# Patient Record
Sex: FEMALE | Race: WHITE | URBAN - METROPOLITAN AREA
[De-identification: names, ages, dates, MRNs, and addresses within clinical notes are randomized per-mention and may not be internally consistent; named-entity substitution may affect disease eponyms.]

---

## 2021-01-25 ENCOUNTER — HISTORICAL (OUTPATIENT)
Dept: SURGERY | Facility: HOSPITAL | Age: 61
End: 2021-01-25

## 2022-05-01 NOTE — OP NOTE
Patient:   Josefina Constantino             MRN: [id number]            FIN: [id number]               Age:   60 years     Sex:  Female     :  1960   Associated Diagnoses:   End stage renal disease on dialysis   Author:   Sandy SIFUENTES, Maryuri CASTAÑEDA      Operative Note   Operative Information   Date/ Time:  2021 11:42:00.     Procedures Performed: Left brachiocephalic fistula   .     Indications: Mrs. Constantino is a 59 y/o woman with ESRD who needs long term hemodialysis access creation.   Preoperative mapping suggests a suitable left upper arm cephalic vein..     Preoperative Diagnosis: End stage renal disease on dialysis (PDD53-NO N18.6).     Postoperative Diagnosis: End stage renal disease on dialysis (SUS38-PB N18.6).     Surgeon: Maryuri Delgado MD.     Assistant: Laurie Chong.     Speciman Removed: none   .     Esimated blood loss: loss less than  100  cc.     Description of Procedure/Findings/    Complications: The risks and benefits of the procedure were discussed with the patient prior to the procedure and the patient desires to proceed.   Her left arm was prepped in the usual sterile fashion.   An appropriate time out was performed.  Ultrasound was utlilized to identify the cephalic vein in the upper arm.  It was patent throughout and appropriate in size in the upper arm.   A curvilinear incision was made over the arterial structures (high bifurcation) and the cephalic vein just proximal to the antecubital fossa.   Dissection was performed through the superficial soft tissues and then followed the appropriate plane laterally and medially to identify the cephalic vein and the upper arm arteries.  Each of these vessels were isolated and side branches were ligated with 4-0 silk when appropriate.  The brachial artery and the adjacent artery were both evaluated.   Although the brachial artery was larger, the arteries were very similar in size.  The more superficial artery created better  positioning for the fistula and was selected as the donor artery.   Her lack of adipose tissue otherwise would result in significant angulation and compression to the fistula.   The cephalic vein was ligated distally with a 3-0 silk. The artery had no disease and appeared appropriate for fistula creation.  The cephalic vein easily accepted a 4mm dilator and demonstrated a thrill with heparin saline injection.  4000 units of heparin was given IV.  A longitudinal incision was made on the brachial artery.  The vein was spatulated appropriately.  Anastamosis was created with a 6-0 prolene suture. Hemostasis was obtained and the soft tissue was dissected to allow for appropriate lay of the vein graft.  There was a good thrill and a palpable radial pulse. 3-0 vicryl was utilized to close the subcutaneous structures and a 4-0 monocryl was utilized to close the skin. Dermabond was used to dress the wound. This completed the procedure.       Laurie Barrow expertly assisted throughout the case.  She assisted with vessel exposure and anastamosis.  She performed wound closure. .     Findings:    ,    .     Complications: None.

## 2024-06-19 RX ORDER — PANTOPRAZOLE SODIUM 40 MG/1
1 TABLET, DELAYED RELEASE ORAL DAILY
COMMUNITY
Start: 2024-01-22

## 2024-06-19 RX ORDER — LEVOTHYROXINE SODIUM 112 UG/1
1 TABLET ORAL DAILY
COMMUNITY

## 2024-06-19 RX ORDER — LEVOFLOXACIN 750 MG/1
1 TABLET ORAL DAILY
COMMUNITY
Start: 2024-04-09 | End: 2024-07-04 | Stop reason: ALTCHOICE

## 2024-06-19 RX ORDER — ALPRAZOLAM 0.25 MG/1
1 TABLET ORAL DAILY PRN
COMMUNITY

## 2024-06-19 RX ORDER — PREDNISONE 20 MG/1
2 TABLET ORAL DAILY
COMMUNITY
Start: 2024-04-09

## 2024-06-19 RX ORDER — FERRIC CITRATE 210 MG/1
TABLET, COATED ORAL
COMMUNITY

## 2024-06-19 RX ORDER — ONDANSETRON 4 MG/1
TABLET, ORALLY DISINTEGRATING ORAL
COMMUNITY

## 2024-06-19 RX ORDER — ALBUTEROL SULFATE 0.83 MG/ML
SOLUTION RESPIRATORY (INHALATION)
COMMUNITY
Start: 2024-06-04

## 2024-06-19 RX ORDER — CITALOPRAM 20 MG/1
1 TABLET, FILM COATED ORAL DAILY
COMMUNITY

## 2024-06-19 RX ORDER — SUCROFERRIC OXYHYDROXIDE 500 MG/1
TABLET, CHEWABLE ORAL
COMMUNITY
Start: 2023-12-26

## 2024-06-19 RX ORDER — AMLODIPINE BESYLATE 5 MG/1
TABLET ORAL
COMMUNITY
Start: 2023-11-10

## 2024-06-19 RX ORDER — BUDESONIDE 0.5 MG/2ML
INHALANT ORAL
COMMUNITY
Start: 2024-03-25

## 2024-06-19 RX ORDER — IPRATROPIUM BROMIDE AND ALBUTEROL SULFATE 2.5; .5 MG/3ML; MG/3ML
SOLUTION RESPIRATORY (INHALATION)
COMMUNITY
Start: 2024-04-09

## 2024-06-19 NOTE — PROGRESS NOTES
[facility] - Clinic Note  Maryuri Delgado MD      Patient Name: Josefina Constantino                   : 1960     MRN: [id number]   Visit Date: 2024          History Present Illness     Reason for Visit: Aneurysmal Changes    Ms. Constantino presents to the clinic for evaluation of aneurysmal changes to her left brachiocephalic fistula. Her access was creation in . Most recent clearance was 2.1 and access flow was 1167. She denies issues with cannulation. There is occasional bleeding post cannulation.        REVIEW OF SYSTEMS:  ROS  12 point review of systems conducted, negative except as stated in the history of present illness. See HPI for details.        Physical Exam      Vitals:    24 1527   BP: (!) 153/75   BP Location: Right arm   Pulse: 73        General: well-nourished, no acute distress, and healthy appearing, ambulating with assistance, alert, pleasant, conversant, and oriented, blind  Respiratory: breathing easily and without respiratory distress  Abdomen: normal and soft  Cardiology: regular rate and rhythm    Upper Extremity Arterial Exam:   Left - radial is palpable    Dialysis Access:  left brachiocephalic fistula  Assessment: aneurysmal changes with focal skin fusion at cannulation sites (cannulation seems to be kept to focal areas)    Musculoskeletal:   Upper Extremity: normal left hand function  Lower Extremity: no edema present to bilateral lower extremities              Assessment and Plan     Ms. Constantino is a 64 y.o. woman with end stage renal disease (ESRD) on dialysis who presents for evaluation of her aneurysmal left brachiocephalic fistula. On exam today, her aneurysmal fistula has focal skin fusion at both cannulation sites - which do not appear to be undergoing much rotation. There is a good thrill without pulsatility appreciated. Dialysis unit reports access is without functional issues. Advise rotation of cannulation sites to avoid further change in the  areas of skin fusion. I do not recommend intervention at this time as her the appearance of his fistula is overall healthy. Recommend continued surveillance of access in 6 months. Instructed patient to return sooner with any issues or concerns.        1. Mechanical complication of arteriovenous shunt surgically created, initial encounter    2. ESRD (end stage renal disease)             Imaging Obtained/Reviewed   Study: none  Date:              Medical History     Past Medical History:   Diagnosis Date    AAA (abdominal aortic aneurysm)     repaired 2020    COPD (chronic obstructive pulmonary disease)     COVID-19     ESRD (end stage renal disease)     on HD    HTN (hypertension)     Hypertensive renal failure     Stroke     h/o TIA    Thyroid disease      Past Surgical History:   Procedure Laterality Date    AV FISTULA PLACEMENT Left 2021    L brachiocephalic fistula - [place]     SECTION      CHOLECYSTECTOMY      INSERTION OF TUNNELED CATHETER      REMOVAL OF TUNNELED CENTRAL VENOUS CATHETER ([facility])      THORACOABDOMINAL AORTIC ANEURYSM REPAIR N/A 2020    Stanton Trejo MD     No family history on file.  Social History     Socioeconomic History    Marital status: Unknown   Tobacco Use    Smoking status: Every Day     Current packs/day: 0.50     Average packs/day: 0.5 packs/day for 44.5 years (22.3 ttl pk-yrs)     Types: Cigarettes     Start date:     Smokeless tobacco: Never     Current Outpatient Medications   Medication Instructions    albuterol (PROVENTIL) 2.5 mg /3 mL (0.083 %) nebulizer solution USE 1 VIAL IN NEBULIZER EVERY 4 TO 6 HOURS AS NEEDED    albuterol-ipratropium (DUO-NEB) 2.5 mg-0.5 mg/3 mL nebulizer solution USE 1 VIAL IN NEBULIZER EVERY 4 HOURS    ALPRAZolam (XANAX) 0.25 MG tablet 1 tablet, Oral, Daily PRN    amLODIPine (NORVASC) 5 MG tablet Take 1 tablet every day by oral route at bedtime for 90 days.    budesonide (PULMICORT) 0.5 mg/2 mL nebulizer solution USE 1 VIAL IN  NEBULIZER TWICE DAILY AS DIRECTED    citalopram (CELEXA) 20 MG tablet 1 tablet, Oral, Daily    ferric citrate (AURYXIA) 210 mg iron Tab     levoFLOXacin (LEVAQUIN) 750 MG tablet 1 tablet, Oral, Daily    levothyroxine (SYNTHROID) 112 MCG tablet 1 tablet, Oral, Daily    ondansetron (ZOFRAN-ODT) 4 MG TbDL DISSOLVE ONE TABLET UNDER THE TONGUE EVERY 8 HOURS AS NEEDED    ondansetron HCl (ZOFRAN ORAL) 4 mg    pantoprazole (PROTONIX) 40 MG tablet 1 tablet, Oral, Daily    predniSONE (DELTASONE) 20 MG tablet 2 tablets, Oral, Daily    sucroferric oxyhydroxide (VELPHORO) 500 mg Chew      Review of patient's allergies indicates:  No Known Allergies    Patient Care Team:  [facility] [place] ([facility])  Doug Noel MD (Nephrology)  [facility] - (Home Health Services)      No follow-ups on file. In addition to their scheduled follow up, the patient has also been instructed to follow up on as needed basis.     No future appointments.

## 2024-07-02 ENCOUNTER — OFFICE VISIT (OUTPATIENT)
Dept: VASCULAR SURGERY | Facility: CLINIC | Age: 64
End: 2024-07-02
Payer: MEDICARE

## 2024-07-02 VITALS — DIASTOLIC BLOOD PRESSURE: 75 MMHG | SYSTOLIC BLOOD PRESSURE: 153 MMHG | HEART RATE: 73 BPM

## 2024-07-02 DIAGNOSIS — N18.6 ESRD (END STAGE RENAL DISEASE): ICD-10-CM

## 2024-07-02 DIAGNOSIS — T82.590A MECHANICAL COMPLICATION OF ARTERIOVENOUS SHUNT SURGICALLY CREATED, INITIAL ENCOUNTER: Primary | ICD-10-CM

## 2025-01-10 ENCOUNTER — TELEPHONE (OUTPATIENT)
Dept: VASCULAR SURGERY | Facility: CLINIC | Age: 65
End: 2025-01-10
Payer: MEDICARE